# Patient Record
Sex: FEMALE | Race: WHITE | NOT HISPANIC OR LATINO | URBAN - NONMETROPOLITAN AREA
[De-identification: names, ages, dates, MRNs, and addresses within clinical notes are randomized per-mention and may not be internally consistent; named-entity substitution may affect disease eponyms.]

---

## 2025-02-05 ENCOUNTER — APPOINTMENT (OUTPATIENT)
Dept: URGENT CARE | Facility: URGENT CARE | Age: 20
End: 2025-02-05

## 2025-07-01 ENCOUNTER — OFFICE VISIT (OUTPATIENT)
Dept: URGENT CARE | Facility: URGENT CARE | Age: 20
End: 2025-07-01
Payer: COMMERCIAL

## 2025-07-01 VITALS
RESPIRATION RATE: 20 BRPM | SYSTOLIC BLOOD PRESSURE: 109 MMHG | TEMPERATURE: 98.6 F | DIASTOLIC BLOOD PRESSURE: 72 MMHG | HEART RATE: 77 BPM | BODY MASS INDEX: 27.88 KG/M2 | OXYGEN SATURATION: 98 % | WEIGHT: 167.55 LBS

## 2025-07-01 DIAGNOSIS — R21 RASH: Primary | ICD-10-CM

## 2025-07-01 RX ORDER — TRIAMCINOLONE ACETONIDE 1 MG/G
OINTMENT TOPICAL 2 TIMES DAILY
Qty: 15 G | Refills: 0 | Status: SHIPPED | OUTPATIENT
Start: 2025-07-01 | End: 2025-07-09

## 2025-07-01 RX ORDER — CEPHALEXIN 500 MG/1
500 CAPSULE ORAL 3 TIMES DAILY
Qty: 30 CAPSULE | Refills: 0 | Status: SHIPPED | OUTPATIENT
Start: 2025-07-01 | End: 2025-07-11

## 2025-07-01 ASSESSMENT — ENCOUNTER SYMPTOMS
OCCASIONAL FEELINGS OF UNSTEADINESS: 0
DEPRESSION: 0
LOSS OF SENSATION IN FEET: 0

## 2025-07-01 ASSESSMENT — PAIN SCALES - GENERAL: PAINLEVEL_OUTOF10: 8

## 2025-07-01 NOTE — PROGRESS NOTES
Subjective   Patient ID: Marilee Layne is a 19 y.o. female. They present today with a chief complaint of Other (Pt. C/O wound under the right breast area with redness, warm to touch and pain. /Started 2 days ago. ).    History of Present Illness  19-year-old female here with complaints of redness type rash under the right breast symptoms for the last 2 days denies any similar episodes in the past    Symptoms started after she was in a wet bathing suit denies any previous episodes of the same      Last menstrual period June 23, 2025          Past Medical History  Allergies as of 07/01/2025    (No Known Allergies)       Prescriptions Prior to Admission[1]     Medical History[2]    Surgical History[3]     reports that she has never smoked. She has never used smokeless tobacco.    Review of Systems  Review of Systems   Skin:  Positive for rash.        Rash under the right breast                                  Objective    Vitals:    07/01/25 1917   Resp: 20   Weight: 76 kg (167 lb 8.8 oz)     Patient's last menstrual period was 06/27/2025 (exact date).    Physical Exam  Vitals and nursing note reviewed.   Constitutional:       Appearance: Normal appearance.   HENT:      Head: Normocephalic and atraumatic.      Right Ear: Tympanic membrane, ear canal and external ear normal.      Left Ear: Tympanic membrane, ear canal and external ear normal.      Nose: No congestion.      Mouth/Throat:      Mouth: Mucous membranes are moist.   Neck:      Vascular: No carotid bruit.   Cardiovascular:      Rate and Rhythm: Normal rate and regular rhythm.   Pulmonary:      Effort: Pulmonary effort is normal.      Breath sounds: Normal breath sounds.   Musculoskeletal:      Cervical back: Normal range of motion and neck supple. No rigidity or tenderness.   Lymphadenopathy:      Cervical: No cervical adenopathy.   Skin:     Capillary Refill: Capillary refill takes less than 2 seconds.      Findings: Rash present.      Comments:  "Erythematous rash is to the inferior aspect of the right breast area is 3 cm x 3.5 cm oblong oval    No surrounding erythema  Appears to be secondary irritant from friction   Neurological:      General: No focal deficit present.      Mental Status: She is alert and oriented to person, place, and time.         Procedures    Point of Care Test & Imaging Results from this visit  No results found for this visit on 07/01/25.   Imaging  No results found.    Cardiology, Vascular, and Other Imaging  No other imaging results found for the past 2 days      Diagnostic study results (if any) were reviewed by Yina Rubio PA-C.    Assessment/Plan   Allergies, medications, history, and pertinent labs/EKGs/Imaging reviewed by Yina Rubio PA-C.     Medical Decision Making  Differential:   1) irritant dermatitis   2) heat rash   3) tinea    19-year-old female here with a rash she indicated was under her right breast on exam she has a 3 x 3.5 cm oblong irritant type rash to her inferior aspect of her breast at about mid 6:00 no surrounding erythema, states she noted it after she had been wearing a wet bathing suit for several hours and the area had been \"rubbing\" denies any similar episodes in the past  On exam slight erythematous base consistent with friction type irritant  Applied antibiotic ointment barrier type dressing told patient to keep covered when she is out and about doing anything to help reduce friction to the area, to apply the antibiotic ointment  Monitor for any signs of infection including increased redness purulent discharge or other symptoms if she develops any of these advised she needs to be started  on antibiotic    Will need to be reevaluated     Plan: Discussed differential with the patient and /or parents family   Patient to  follow up with the PCP in the next 2-3 days  Return for any worsening symptoms or go to the ER for further evaluation. Patient/family/caregiver  understands return   precautions and " discharge instructions and is agreeable to the current plan   Impression: Rash friction type        Orders and Diagnoses for this visit    Orders and Diagnoses  There are no diagnoses linked to this encounter.    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    Pediculosis capitis    Personal history of other diseases of the digestive system 05/21/2018    History of constipation    Personal history of other diseases of the respiratory system 05/25/2017    History of acute pharyngitis    Personal history of other infectious and parasitic diseases 05/26/2017    History of infectious mononucleosis    Personal history of other specified conditions     History of dysuria    Personal history of other specified conditions 05/21/2018    History of dysuria    Personal history of other specified conditions 05/21/2018    History of urinary frequency    Personal history of urinary (tract) infections 07/07/2014    Personal history of urinary tract infection    Proteinuria, unspecified 05/23/2018    Proteinuria    Unspecified fracture of lower end of unspecified tibia, initial encounter for closed fracture 08/04/2014    Fracture of lower end of tibia   [3] History reviewed. No pertinent surgical history.

## 2025-07-01 NOTE — PATIENT INSTRUCTIONS
Keep the area covered to help minimize any additional irritation    If you develop any increased red milky yellow purulent type drainage you need to get rechecked immediately

## 2025-07-02 ENCOUNTER — TELEPHONE (OUTPATIENT)
Dept: URGENT CARE | Facility: URGENT CARE | Age: 20
End: 2025-07-02